# Patient Record
Sex: FEMALE | Race: WHITE
[De-identification: names, ages, dates, MRNs, and addresses within clinical notes are randomized per-mention and may not be internally consistent; named-entity substitution may affect disease eponyms.]

---

## 2020-07-07 ENCOUNTER — HOSPITAL ENCOUNTER (EMERGENCY)
Dept: HOSPITAL 52 - LL.ED | Age: 45
Discharge: HOME | End: 2020-07-07
Payer: SELF-PAY

## 2020-07-07 DIAGNOSIS — Z91.030: ICD-10-CM

## 2020-07-07 DIAGNOSIS — Z88.8: ICD-10-CM

## 2020-07-07 DIAGNOSIS — S05.01XA: Primary | ICD-10-CM
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
## 2020-07-07 RX ADMIN — TETRACAINE HYDROCHLORIDE ONE ML: 5 SOLUTION OPHTHALMIC at 10:16

## 2020-07-07 NOTE — EDM.PDOC
ED HPI GENERAL MEDICAL PROBLEM





- General


Chief Complaint: Eye Problems


Stated Complaint: right eye injury


Time Seen by Provider: 20 10:15


Source of Information: Reports: Patient


History Limitations: Reports: No Limitations





- History of Present Illness


INITIAL COMMENTS - FREE TEXT/NARRATIVE: 





Pain in right eye after getting a piece of tree bark in it yesterday  Pain 

increased today  Vision intact  Ice to area yesterday


Onset: Sudden


Duration: Day(s):


Location: Reports: Face


Context: Reports: Trauma





- Related Data


                                    Allergies











Allergy/AdvReac Type Severity Reaction Status Date / Time


 


aspirin Allergy  Vomiting Verified 20 10:20


 


Bee stings Allergy  Swelling Uncoded 20 10:20











Home Meds: 


                                    Home Meds





. [No Known Home Meds]  18 [History]











Past Medical History





- Past Surgical History


HEENT Surgical History: Reports: Tonsillectomy


GI Surgical History: Reports: Appendectomy


Female  Surgical History: Reports:  Section, Tubal Ligation, Other 

(See Below)


Other Female  Surgeries/Procedures: partial hysterectomy.  exp lap





ED ROS GENERAL





- Review of Systems


Review Of Systems: See Below


HEENT: Reports: Eye Pain





ED EXAM GENERAL W FULL EYE





- Physical Exam


Exam: See Below


Exam Limited By: No Limitations


General Appearance: Mild Distress


Eye Exam: Right Eye: Corneal Abrasion, Left Eye: Normal Inspection, Bilateral 

Eye: EOMI, PERRL


Eyelids: Bilateral: Normal Appearance


Cornea Exam: Right: Corneal Abrasion (9 o'clock position)





Course





- Orders/Labs/Meds


Meds: 





Medications














Discontinued Medications














Generic Name Dose Route Start Last Admin





  Trade Name Freq  PRN Reason Stop Dose Admin


 


Tetracaine HCl  1 ml  20 10:12  20 10:16





  Tetracaine 0.5% Steri-Unit Sol  EYERT  20 10:13  1 ml





  ASDIRECTED ONE   Administration














Departure





- Departure


Time of Disposition: 10:30


Disposition: Home, Self-Care 01


Clinical Impression: 


Corneal abrasion


Qualifiers:


 Encounter type: initial encounter Laterality: right Qualified Code(s): S05.01XA

 - Injury of conjunctiva and corneal abrasion without foreign body, right eye, 

initial encounter








- Discharge Information


*PRESCRIPTION DRUG MONITORING PROGRAM REVIEWED*: Not Applicable


*COPY OF PRESCRIPTION DRUG MONITORING REPORT IN PATIENT NOMAN: Not Applicable


Instructions:  Corneal Abrasion


Referrals: 


Johana Goff, NP [Primary Care Provider] - 


Additional Instructions: 


Rx Gentoptic  2 drops to affected eye every 6 hours for 3 days

## 2020-12-09 ENCOUNTER — HOSPITAL ENCOUNTER (EMERGENCY)
Dept: HOSPITAL 52 - LL.ED | Age: 45
Discharge: HOME | End: 2020-12-09
Payer: SELF-PAY

## 2020-12-09 DIAGNOSIS — Z88.8: ICD-10-CM
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
## 2020-12-09 DIAGNOSIS — S80.01XA: Primary | ICD-10-CM

## 2020-12-09 DIAGNOSIS — Z91.030: ICD-10-CM

## 2020-12-09 DIAGNOSIS — Z90.710: ICD-10-CM

## 2020-12-09 DIAGNOSIS — Z90.49: ICD-10-CM

## 2020-12-09 DIAGNOSIS — Z98.51: ICD-10-CM

## 2020-12-09 DIAGNOSIS — M54.6: ICD-10-CM

## 2020-12-09 NOTE — EDM.PDOC
ED HPI GENERAL MEDICAL PROBLEM





- General


Chief Complaint: Back Pain or Injury


Stated Complaint: back pain after fall


Time Seen by Provider: 20 15:50


Source of Information: Reports: Patient


History Limitations: Reports: No Limitations





- History of Present Illness


INITIAL COMMENTS - FREE TEXT/NARRATIVE: 





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  

Difficulty with twisting motion.  No shoulder pain/good ROM upper arms.  No 

lower back pain. Has some knee discomfort but able to ambulate and no swelling 

noted.  Did not hit head.  No other reported injuries at this time. 





- Related Data


                                    Allergies











Allergy/AdvReac Type Severity Reaction Status Date / Time


 


aspirin Allergy  Vomiting Verified 20 15:53


 


Bee stings Allergy  Swelling Uncoded 20 15:53











Home Meds: 


                                    Home Meds





. [No Known Home Meds]  18 [History]











Past Medical History


Other HEENT History: Legally Blind in Left eye due to birth defect


Psychiatric History: Reports: Anxiety, Depression





- Past Surgical History


HEENT Surgical History: Reports: Tonsillectomy


GI Surgical History: Reports: Appendectomy


Female  Surgical History: Reports:  Section, Tubal Ligation, Other 

(See Below)


Other Female  Surgeries/Procedures: partial hysterectomy.  exp lap





Social & Family History





- Caffeine Use


Caffeine Use: Reports: Coffee, Energy Drinks





ED ROS GENERAL





- Review of Systems


Review Of Systems: See Below


HEENT: Denies: Ear Pain, Nosebleed, Nose Pain, Vision Change


Cardiovascular: Denies: Dyspnea on Exertion, Lightheadedness, Palpitations, 

Syncope


GI/Abdominal: Reports: No Symptoms


: Reports: No Symptoms


Musculoskeletal: Reports: Back Pain, Joint Pain (right knee).  Denies: Neck Pain


Skin: Denies: Erythema, Wound


Neurological: Reports: No Symptoms


Psychiatric: Reports: No Symptoms





ED EXAM, GENERAL





- Physical Exam


Exam: See Below


Exam Limited By: No Limitations


General Appearance: Alert, WD/WN, Mild Distress


Eye Exam: Bilateral Eye: EOMI, PERRL


Ears: Normal External Exam, Hearing Grossly Normal


Nose: No: Nasal Deformity, Nasal Swelling, Nasal Drainage


Throat/Mouth: Normal Lips, Normal Voice, No Airway Compromise


Head: Atraumatic, Normocephalic


Neck: Normal Inspection, Supple, Non-Tender, Full Range of Motion


Respiratory/Chest: No Respiratory Distress, Lungs Clear, Normal Breath Sounds, 

No Accessory Muscle Use, Chest Non-Tender


Cardiovascular: Regular Rate, Rhythm, No Murmur


GI/Abdominal: Normal Bowel Sounds, Soft, Non-Tender, No Distention


 (Female) Exam: Deferred


Rectal (Female) Exam: Deferred


Back Exam: Other (tenderness with palpation along upper half thoracic spine and 

adjacent soft tissue)


Extremities: Normal Range of Motion, Other (mild tenderness right knee/no 

deformity or swelling noted. Able to stand on affected leg.  Discomfort noted in

 upper spine between shoulder blades when patient attempts to raise arms above 

shoulders.  Passive ROM better than active. )


Neurological: Alert, Oriented, CN II-XII Intact, Normal Cognition, No 

Motor/Sensory Deficits





Course





- Vital Signs


Last Recorded V/S: 


                                Last Vital Signs











Temp  36.4 C   20 15:45


 


Pulse  90   20 15:45


 


Resp  16   20 15:45


 


BP  107/77   20 15:45


 


Pulse Ox  99   20 15:45














- Orders/Labs/Meds


Orders: 


                               Active Orders 24 hr











 Category Date Time Status


 


 Thoracic Spine 3V [CR] Stat Exams  20 16:00 Taken














- Re-Assessments/Exams


Free Text/Narrative Re-Assessment/Exam: 





20 16:24


Xray of thoracic spine ordered. 





20 17:13


No obvious acute fractures noted on plain films.  Plan at this time is to have 

patient observe for changes over next two days.  To make follow up appointment 

for Friday, two days from now, in clinic.  Recheck at that time.  If still 

significant discomfort consider CT of upper spine, or even MRI next Monday for 

better evaluation of discs/soft tissue. Patient declines muscle 

relaxant/stronger pain meds. Ice/rest/NSAID/Tylenol recommended.  No work until 

Saturday. Further restrictions as needed at clinic recheck. 





Departure





- Departure


Time of Disposition: 17:01


Disposition: Home, Self-Care 01


Condition: Good


Clinical Impression: 


 Acute upper back pain





Fall


Qualifiers:


 Encounter type: initial encounter Qualified Code(s): W19.XXXA - Unspecified 

fall, initial encounter





Contusion of right knee


Qualifiers:


 Encounter type: initial encounter Qualified Code(s): S80.01XA - Contusion of 

right knee, initial encounter








- Discharge Information


*PRESCRIPTION DRUG MONITORING PROGRAM REVIEWED*: Not Applicable


*COPY OF PRESCRIPTION DRUG MONITORING REPORT IN PATIENT NOMAN: Not Applicable


Referrals: 


Christa Dumont NP [Primary Care Provider] - 


Forms:  ED Department Discharge


Additional Instructions: 


Get rechecked in two days! Make clinic appointment for Friday. See how you are 

feeling once you have rested a few days.  You may need to consider additional 

imaging as we discussed. A CT can look more closely for fractures that can be 

missed on plain films.  An MRI can see fractures AND can look at discs/nerve 

roots for injuries.





Ice/gentle range of motion/Tylenol/Arnica/Ibuprofen for pain is OK.  CBD topical

is ok too.





If you have any sudden new problems, please return to ER. 





Sepsis Event Note (ED)





- Evaluation


Sepsis Screening Result: No Definite Risk





- Focused Exam


Vital Signs: 


                                   Vital Signs











  Temp Pulse Resp BP Pulse Ox


 


 20 15:45  36.4 C  90  16  107/77  99














- My Orders


Last 24 Hours: 


My Active Orders





20 16:00


Thoracic Spine 3V [CR] Stat 














- Assessment/Plan


Last 24 Hours: 


My Active Orders





20 16:00


Thoracic Spine 3V [CR] Stat

## 2022-11-08 ENCOUNTER — HOSPITAL ENCOUNTER (EMERGENCY)
Dept: HOSPITAL 52 - LL.ED | Age: 47
Discharge: HOME | End: 2022-11-08
Payer: COMMERCIAL

## 2022-11-08 DIAGNOSIS — S61.217A: Primary | ICD-10-CM

## 2022-11-08 DIAGNOSIS — Z91.030: ICD-10-CM

## 2022-11-08 DIAGNOSIS — Z88.8: ICD-10-CM
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
## 2022-11-08 PROCEDURE — 99283 EMERGENCY DEPT VISIT LOW MDM: CPT

## 2022-11-08 PROCEDURE — 73120 X-RAY EXAM OF HAND: CPT

## 2022-11-08 PROCEDURE — 12001 RPR S/N/AX/GEN/TRNK 2.5CM/<: CPT

## 2023-10-04 ENCOUNTER — HOSPITAL ENCOUNTER (EMERGENCY)
Dept: HOSPITAL 52 - LL.ED | Age: 48
Discharge: HOME | End: 2023-10-04
Payer: COMMERCIAL
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
## 2023-10-04 DIAGNOSIS — Z88.8: ICD-10-CM

## 2023-10-04 DIAGNOSIS — S61.011A: Primary | ICD-10-CM

## 2023-10-04 DIAGNOSIS — Z87.891: ICD-10-CM

## 2023-10-04 DIAGNOSIS — Z91.038: ICD-10-CM

## 2023-10-04 PROCEDURE — 99283 EMERGENCY DEPT VISIT LOW MDM: CPT

## 2023-10-04 PROCEDURE — 73140 X-RAY EXAM OF FINGER(S): CPT

## 2023-10-04 PROCEDURE — 12002 RPR S/N/AX/GEN/TRNK2.6-7.5CM: CPT
